# Patient Record
Sex: MALE | Race: BLACK OR AFRICAN AMERICAN
[De-identification: names, ages, dates, MRNs, and addresses within clinical notes are randomized per-mention and may not be internally consistent; named-entity substitution may affect disease eponyms.]

---

## 2020-05-11 ENCOUNTER — HOSPITAL ENCOUNTER (EMERGENCY)
Dept: HOSPITAL 26 - MED | Age: 45
Discharge: HOME | End: 2020-05-11
Payer: MEDICAID

## 2020-05-11 VITALS — BODY MASS INDEX: 23.86 KG/M2 | WEIGHT: 180 LBS | HEIGHT: 73 IN

## 2020-05-11 VITALS — DIASTOLIC BLOOD PRESSURE: 97 MMHG | SYSTOLIC BLOOD PRESSURE: 150 MMHG

## 2020-05-11 DIAGNOSIS — I10: ICD-10-CM

## 2020-05-11 DIAGNOSIS — F11.10: ICD-10-CM

## 2020-05-11 DIAGNOSIS — F41.9: ICD-10-CM

## 2020-05-11 DIAGNOSIS — F15.10: ICD-10-CM

## 2020-05-11 DIAGNOSIS — F16.10: Primary | ICD-10-CM

## 2020-05-11 NOTE — NUR
45 Y/O MALE BROUGHT INTO ER BY VINEET COOPER.  IVNEET STATES WAS FOUND BY [facility] [facility] 
LETHARGIC, HOWEVER UPON THEIR ARRIVAL PT WAS [facility] 15.  PER [facility] PT STATED HE FELT 
NERVOUS, AND ANXIETY WAS KICKING IN DUE TO IV DRUG USE OF HEROINX 3-4 HRS AGO 
IN THE LEFT ARM, AND TOES.  PT STATED HE WAS RELEASED FROM prison 05/10/20.  "[place] 
[place]". DENIES SOB, R/R EQUAL AND UNLABORED.  C/O NAUSEA, 
VOMITING, DIARRHEA X 1 DAY. 0/10 PAIN. DENIES SI/HI. PT SITTING IN CHAIR A, 
REQUESTED A SANDWICH AND JUICE, GIVEN REQUESTED ITEMS.  WENT INTO BATHROOM AND 
BEGAN TO SHAVE.  REQUESTED FOR TAXI TO TAKE HIM TO "[place] [facility]".  WILL CONTINUE TO MONITOR



PMH: HTN; ANXIETY; BIPOLAR DISORDER

NKDA

## 2020-05-11 NOTE — NUR
PT FOUND IN RESTROOM ATTEMPTING TO SHAVE WITH PERSONAL CLIPPERS. INFORMED PT 
THIS WAS NOT ALLOWED. PT REQUESTING FOOD AND A BED TO REST IN. PT PROVIDED WITH 
SANDWICH AND JUICE AND EXPLAINED THAT HE WAS IN A FAST TRACK CHAIR TO EXPEDITE 
CARE DUE TO NO BED AVAILIBILITY.

## 2020-05-11 NOTE — NUR
PT ASKING STAFF FOR MONEY AND OR DEBIT CARD. INFORMED PT THIS WAS INAPPROPRIATE 
AND NOT ALLOWED. PT DISCHARGED AT THIS TIME; OFFERED [employer], 
INFORMATION FOR DRUG/ALCOHOL RESOURCES, SANDWICH, JUICE. PROVIDED WRITTEN AND 
VERBAL DISCHARGE INSTRUCTIONS. PT REFUSED TO SIGN D/C PAPERWORK. LEFT FACILITY 
AMBULATORY WITH STEADY GAIT. ALERT TO NAME BIRTHDAY [facility] EVENT.

## 2020-05-11 NOTE — NUR
FOUND PT AGAIN IN RESTROOM ATTEMPTING TO SHAVE. REDIRECTED PT TO CHAIR A. 
RE-EXPLAINED THAT SHAVING IN RESTROOM WITH PLUG IN [facility] WAS NOT ALLOWED.

## 2021-06-26 ENCOUNTER — HOSPITAL ENCOUNTER (EMERGENCY)
Dept: HOSPITAL 87 - ER | Age: 46
Discharge: HOME | End: 2021-06-26
Payer: SELF-PAY

## 2021-06-26 VITALS — SYSTOLIC BLOOD PRESSURE: 145 MMHG | DIASTOLIC BLOOD PRESSURE: 90 MMHG

## 2021-06-26 VITALS — BODY MASS INDEX: 22.4 KG/M2 | WEIGHT: 165.35 LBS | HEIGHT: 72 IN

## 2021-06-26 DIAGNOSIS — B34.9: Primary | ICD-10-CM

## 2021-06-26 DIAGNOSIS — I44.4: ICD-10-CM

## 2021-06-26 DIAGNOSIS — R45.7: ICD-10-CM

## 2021-06-26 DIAGNOSIS — R10.84: ICD-10-CM

## 2021-06-26 DIAGNOSIS — E87.6: ICD-10-CM

## 2021-06-26 LAB
BASOPHILS NFR BLD AUTO: 0.8 % (ref 0–2)
CHLORIDE SERPL-SCNC: 109 MEQ/L (ref 98–107)
EOSINOPHIL NFR BLD AUTO: 3.5 % (ref 0–5)
ERYTHROCYTE [DISTWIDTH] IN BLOOD BY AUTOMATED COUNT: 14.6 % (ref 11.6–14.6)
HCT VFR BLD AUTO: 44.6 % (ref 42–52)
HGB BLD-MCNC: 14.8 G/DL (ref 14–18)
LYMPHOCYTES NFR BLD AUTO: 44 % (ref 20–50)
MCH RBC QN AUTO: 28.5 PG (ref 28–32)
MCV RBC AUTO: 85.6 FL (ref 80–94)
MONOCYTES NFR BLD AUTO: 9.8 % (ref 2–8)
NEUTROPHILS NFR BLD AUTO: 41.9 % (ref 40–76)
PLATELET # BLD AUTO: 210 X1000/UL (ref 130–400)
PMV BLD AUTO: 8.5 FL (ref 7.4–10.4)
RBC # BLD AUTO: 5.21 MILL/UL (ref 4.7–6.1)

## 2021-06-26 PROCEDURE — 36415 COLL VENOUS BLD VENIPUNCTURE: CPT

## 2021-06-26 PROCEDURE — 71045 X-RAY EXAM CHEST 1 VIEW: CPT

## 2021-06-26 PROCEDURE — 85025 COMPLETE CBC W/AUTO DIFF WBC: CPT

## 2021-06-26 PROCEDURE — 80053 COMPREHEN METABOLIC PANEL: CPT

## 2021-06-26 PROCEDURE — 93005 ELECTROCARDIOGRAM TRACING: CPT

## 2021-06-26 PROCEDURE — 99285 EMERGENCY DEPT VISIT HI MDM: CPT

## 2021-07-27 ENCOUNTER — HOSPITAL ENCOUNTER (EMERGENCY)
Dept: HOSPITAL 87 - ER | Age: 46
Discharge: HOME | End: 2021-07-27
Payer: MEDICAID

## 2021-07-27 VITALS — WEIGHT: 160.94 LBS | HEIGHT: 69 IN | BODY MASS INDEX: 23.84 KG/M2

## 2021-07-27 VITALS — SYSTOLIC BLOOD PRESSURE: 148 MMHG | DIASTOLIC BLOOD PRESSURE: 71 MMHG

## 2021-07-27 DIAGNOSIS — F17.290: ICD-10-CM

## 2021-07-27 DIAGNOSIS — Z91.14: ICD-10-CM

## 2021-07-27 DIAGNOSIS — Z20.822: Primary | ICD-10-CM

## 2021-07-27 DIAGNOSIS — I49.9: ICD-10-CM

## 2021-07-27 LAB
AMPHETAMINES UR QL SCN: (no result)
APPEARANCE UR: CLEAR
BARBITURATES UR QL SCN: NEGATIVE
BASOPHILS NFR BLD AUTO: 0.5 % (ref 0–2)
BENZODIAZ UR QL SCN: NEGATIVE
BZE UR QL SCN: NEGATIVE
CANNABINOIDS UR QL SCN: (no result)
CHLORIDE SERPL-SCNC: 105 MEQ/L (ref 98–107)
COLOR UR: YELLOW
EOSINOPHIL NFR BLD AUTO: 2.6 % (ref 0–5)
ERYTHROCYTE [DISTWIDTH] IN BLOOD BY AUTOMATED COUNT: 15 % (ref 11.6–14.6)
ETHANOL SERPL-MCNC: < 10 MG/DL
HCT VFR BLD AUTO: 46.1 % (ref 42–52)
HGB BLD-MCNC: 15.3 G/DL (ref 14–18)
HGB UR QL STRIP: NEGATIVE
KETONES UR STRIP-MCNC: (no result) MG/DL
LEUKOCYTE ESTERASE UR QL STRIP: NEGATIVE
LYMPHOCYTES NFR BLD AUTO: 45.1 % (ref 20–50)
MCH RBC QN AUTO: 28.4 PG (ref 28–32)
MCV RBC AUTO: 85.5 FL (ref 80–94)
METHADONE UR QL SCN: NEGATIVE
MONOCYTES NFR BLD AUTO: 7.9 % (ref 2–8)
NEUTROPHILS NFR BLD AUTO: 43.9 % (ref 40–76)
NITRITE UR QL STRIP: NEGATIVE
OPIATES UR QL SCN: NEGATIVE
PCP UR QL SCN: (no result)
PH UR STRIP: 6.5 [PH] (ref 4.5–8)
PLATELET # BLD AUTO: 217 X1000/UL (ref 130–400)
PMV BLD AUTO: 8.3 FL (ref 7.4–10.4)
PROT UR QL STRIP: (no result)
RBC # BLD AUTO: 5.39 MILL/UL (ref 4.7–6.1)
SP GR UR STRIP: 1.02 (ref 1–1.03)
UROBILINOGEN UR STRIP-MCNC: 2 E.U./DL (ref 0.2–1)

## 2021-07-27 PROCEDURE — 85025 COMPLETE CBC W/AUTO DIFF WBC: CPT

## 2021-07-27 PROCEDURE — 99284 EMERGENCY DEPT VISIT MOD MDM: CPT

## 2021-07-27 PROCEDURE — 80320 DRUG SCREEN QUANTALCOHOLS: CPT

## 2021-07-27 PROCEDURE — 81003 URINALYSIS AUTO W/O SCOPE: CPT

## 2021-07-27 PROCEDURE — 80053 COMPREHEN METABOLIC PANEL: CPT

## 2021-07-27 PROCEDURE — 36415 COLL VENOUS BLD VENIPUNCTURE: CPT

## 2021-07-27 PROCEDURE — 82962 GLUCOSE BLOOD TEST: CPT

## 2021-07-27 PROCEDURE — 87635 SARS-COV-2 COVID-19 AMP PRB: CPT

## 2021-07-27 PROCEDURE — 80329 ANALGESICS NON-OPIOID 1 OR 2: CPT

## 2021-07-27 PROCEDURE — 80307 DRUG TEST PRSMV CHEM ANLYZR: CPT

## 2021-07-27 PROCEDURE — 80305 DRUG TEST PRSMV DIR OPT OBS: CPT

## 2021-07-27 PROCEDURE — 93005 ELECTROCARDIOGRAM TRACING: CPT

## 2021-07-27 PROCEDURE — G0480 DRUG TEST DEF 1-7 CLASSES: HCPCS

## 2021-07-27 PROCEDURE — 99406 BEHAV CHNG SMOKING 3-10 MIN: CPT

## 2023-08-07 ENCOUNTER — HOSPITAL ENCOUNTER (EMERGENCY)
Dept: HOSPITAL 87 - ER | Age: 48
Discharge: HOME | End: 2023-08-07
Payer: MEDICAID

## 2023-08-07 VITALS — HEIGHT: 72 IN | BODY MASS INDEX: 24.49 KG/M2 | WEIGHT: 180.78 LBS

## 2023-08-07 VITALS
DIASTOLIC BLOOD PRESSURE: 92 MMHG | TEMPERATURE: 98.6 F | RESPIRATION RATE: 16 BRPM | OXYGEN SATURATION: 99 % | HEART RATE: 72 BPM | SYSTOLIC BLOOD PRESSURE: 132 MMHG

## 2023-08-07 DIAGNOSIS — F10.129: Primary | ICD-10-CM
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
## 2023-08-07 DIAGNOSIS — R10.9: ICD-10-CM

## 2023-08-07 DIAGNOSIS — F31.9: ICD-10-CM

## 2023-08-07 PROCEDURE — 99283 EMERGENCY DEPT VISIT LOW MDM: CPT
